# Patient Record
(demographics unavailable — no encounter records)

---

## 2024-10-09 NOTE — PHYSICAL EXAM
[Left] : left hip [] : hamstring tenderness [FreeTextEntry8] : tenderness at proximal medial hamstring distal to gluteal fold

## 2024-10-09 NOTE — ASSESSMENT
[FreeTextEntry1] : 09/25/2024: B/L L-spine x-rays, 2 views, reveal degenerative disc disease at L4-L5 and L5-S1. Worse at L5-S1 L Hip x-rays, 2v, normal Underlying pathology reviewed and treatment options discussed. Obtain MRI L-spine R/O HNP Obtain MRI L femur r/o occult fx Activity modification as tolerated. Questions addressed. Follow up after MRI.  10/09/2024: MRI reviewed with patient. Start PT and HEP to improve mechanics and reduce pain. Activity modification as tolerated. Questions addressed.  The documentation recorded by the scribe accurately reflects the service I personally performed and the decisions made by me. I, Jeff Kay, attest that this documentation has been prepared under the direction and in the presence of Provider Silviano Turner MD.   The patient was seen by Silviano Turner MD.

## 2024-10-09 NOTE — DATA REVIEWED
[Lumbar Spine] : lumbar spine [MRI] : MRI [Left] : left [Report was reviewed and noted in the chart] : The report was reviewed and noted in the chart [I independently reviewed and interpreted images and report] : I independently reviewed and interpreted images and report [FreeTextEntry2] : L Femur [FreeTextEntry1] : OC MRI L-Spine 9/26/24: Multilevel degenerative disc disease is seen for every lumbar disc interspace.  OC MRI L Femur 9/29/24: High grade partial tearing of the proximal left hamstring tendon insertion with delamination, surrounding soft tissue swelling and bursitis with edema and swelling extending into the posterior [id number] thigh as well as surrounding the left sciatic nerve without full thickness proximal tendon discontinuity or significant retraction suspected. No disproportionate quadriceps muscle atrophy. Clinical correlation regarding acute traumatic injury and clinical follow up is recommended.  No acute fracture or malalignment of the L Femur. Mild subchondral cysts in the anterior superior left acetabulum appear degenerative. The left hip joint is not adequately evaluated on current exam.